# Patient Record
Sex: MALE | NOT HISPANIC OR LATINO | Employment: FULL TIME | URBAN - METROPOLITAN AREA
[De-identification: names, ages, dates, MRNs, and addresses within clinical notes are randomized per-mention and may not be internally consistent; named-entity substitution may affect disease eponyms.]

---

## 2017-02-06 DIAGNOSIS — Z01.812 PRE-OPERATIVE LABORATORY EXAMINATION: ICD-10-CM

## 2017-02-06 LAB
ANION GAP SERPL CALC-SCNC: 11 MMOL/L (ref 0–11.9)
BUN SERPL-MCNC: 17 MG/DL (ref 8–22)
CALCIUM SERPL-MCNC: 9.6 MG/DL (ref 8.5–10.5)
CHLORIDE SERPL-SCNC: 104 MMOL/L (ref 96–112)
CO2 SERPL-SCNC: 23 MMOL/L (ref 20–33)
CREAT SERPL-MCNC: 0.84 MG/DL (ref 0.5–1.4)
ERYTHROCYTE [DISTWIDTH] IN BLOOD BY AUTOMATED COUNT: 39.2 FL (ref 35.9–50)
GFR SERPL CREATININE-BSD FRML MDRD: >60 ML/MIN/1.73 M 2
GLUCOSE SERPL-MCNC: 95 MG/DL (ref 65–99)
HCT VFR BLD AUTO: 42.5 % (ref 42–52)
HGB BLD-MCNC: 14.4 G/DL (ref 14–18)
MCH RBC QN AUTO: 28.4 PG (ref 27–33)
MCHC RBC AUTO-ENTMCNC: 33.9 G/DL (ref 33.7–35.3)
MCV RBC AUTO: 83.8 FL (ref 81.4–97.8)
PLATELET # BLD AUTO: 269 K/UL (ref 164–446)
PMV BLD AUTO: 9.9 FL (ref 9–12.9)
POTASSIUM SERPL-SCNC: 3.9 MMOL/L (ref 3.6–5.5)
RBC # BLD AUTO: 5.07 M/UL (ref 4.7–6.1)
SODIUM SERPL-SCNC: 138 MMOL/L (ref 135–145)
WBC # BLD AUTO: 9.3 K/UL (ref 4.8–10.8)

## 2017-02-06 PROCEDURE — 80048 BASIC METABOLIC PNL TOTAL CA: CPT

## 2017-02-06 PROCEDURE — 36415 COLL VENOUS BLD VENIPUNCTURE: CPT

## 2017-02-06 PROCEDURE — 85027 COMPLETE CBC AUTOMATED: CPT

## 2017-02-07 ENCOUNTER — HOSPITAL ENCOUNTER (OUTPATIENT)
Facility: MEDICAL CENTER | Age: 27
End: 2017-02-07
Attending: SPECIALIST | Admitting: SPECIALIST
Payer: COMMERCIAL

## 2017-02-07 VITALS
TEMPERATURE: 98.1 F | WEIGHT: 160.72 LBS | HEIGHT: 70 IN | HEART RATE: 61 BPM | RESPIRATION RATE: 16 BRPM | OXYGEN SATURATION: 95 % | BODY MASS INDEX: 23.01 KG/M2

## 2017-02-07 PROBLEM — J35.1 TONSILLAR HYPERTROPHY: Status: ACTIVE | Noted: 2017-02-07

## 2017-02-07 PROBLEM — J36 ABSCESS, PERITONSILLAR: Status: ACTIVE | Noted: 2017-02-07

## 2017-02-07 PROBLEM — Z98.890 H/O PERITONSILLAR ABSCESS DRAINAGE: Status: ACTIVE | Noted: 2017-02-07

## 2017-02-07 PROCEDURE — 160035 HCHG PACU - 1ST 60 MINS PHASE I: Performed by: SPECIALIST

## 2017-02-07 PROCEDURE — 160048 HCHG OR STATISTICAL LEVEL 1-5: Performed by: SPECIALIST

## 2017-02-07 PROCEDURE — 501838 HCHG SUTURE GENERAL: Performed by: SPECIALIST

## 2017-02-07 PROCEDURE — 500257: Performed by: SPECIALIST

## 2017-02-07 PROCEDURE — 700111 HCHG RX REV CODE 636 W/ 250 OVERRIDE (IP)

## 2017-02-07 PROCEDURE — 160002 HCHG RECOVERY MINUTES (STAT): Performed by: SPECIALIST

## 2017-02-07 PROCEDURE — 501424 HCHG SPONGE, TONSIL: Performed by: SPECIALIST

## 2017-02-07 PROCEDURE — 700101 HCHG RX REV CODE 250

## 2017-02-07 PROCEDURE — 160027 HCHG SURGERY MINUTES - 1ST 30 MINS LEVEL 2: Performed by: SPECIALIST

## 2017-02-07 PROCEDURE — 160036 HCHG PACU - EA ADDL 30 MINS PHASE I: Performed by: SPECIALIST

## 2017-02-07 PROCEDURE — 502573 HCHG PACK, ENT: Performed by: SPECIALIST

## 2017-02-07 PROCEDURE — 88304 TISSUE EXAM BY PATHOLOGIST: CPT | Mod: 59

## 2017-02-07 PROCEDURE — 110382 HCHG SHELL REV 271: Performed by: SPECIALIST

## 2017-02-07 PROCEDURE — 110371 HCHG SHELL REV 272: Performed by: SPECIALIST

## 2017-02-07 PROCEDURE — A4606 OXYGEN PROBE USED W OXIMETER: HCPCS | Performed by: SPECIALIST

## 2017-02-07 PROCEDURE — A9270 NON-COVERED ITEM OR SERVICE: HCPCS

## 2017-02-07 PROCEDURE — 501155 HCHG PROBE, ENT ORAL: Performed by: SPECIALIST

## 2017-02-07 PROCEDURE — 160009 HCHG ANES TIME/MIN: Performed by: SPECIALIST

## 2017-02-07 PROCEDURE — 502240 HCHG MISC OR SUPPLY RC 0272: Performed by: SPECIALIST

## 2017-02-07 PROCEDURE — 160038 HCHG SURGERY MINUTES - EA ADDL 1 MIN LEVEL 2: Performed by: SPECIALIST

## 2017-02-07 PROCEDURE — 700102 HCHG RX REV CODE 250 W/ 637 OVERRIDE(OP)

## 2017-02-07 RX ORDER — OXYCODONE HCL 5 MG/5 ML
SOLUTION, ORAL ORAL
Status: COMPLETED
Start: 2017-02-07 | End: 2017-02-07

## 2017-02-07 RX ORDER — BUPIVACAINE HYDROCHLORIDE AND EPINEPHRINE 2.5; 5 MG/ML; UG/ML
INJECTION, SOLUTION EPIDURAL; INFILTRATION; INTRACAUDAL; PERINEURAL
Status: DISCONTINUED | OUTPATIENT
Start: 2017-02-07 | End: 2017-02-07 | Stop reason: HOSPADM

## 2017-02-07 RX ORDER — ONDANSETRON 4 MG/1
4 TABLET, ORALLY DISINTEGRATING ORAL EVERY 6 HOURS PRN
Qty: 10 TAB | Refills: 2 | Status: SHIPPED | OUTPATIENT
Start: 2017-02-07

## 2017-02-07 RX ORDER — SODIUM CHLORIDE, SODIUM LACTATE, POTASSIUM CHLORIDE, CALCIUM CHLORIDE 600; 310; 30; 20 MG/100ML; MG/100ML; MG/100ML; MG/100ML
1000 INJECTION, SOLUTION INTRAVENOUS
Status: COMPLETED | OUTPATIENT
Start: 2017-02-07 | End: 2017-02-07

## 2017-02-07 RX ORDER — ONDANSETRON 2 MG/ML
4 INJECTION INTRAMUSCULAR; INTRAVENOUS EVERY 6 HOURS PRN
Status: DISCONTINUED | OUTPATIENT
Start: 2017-02-07 | End: 2017-02-07 | Stop reason: HOSPADM

## 2017-02-07 RX ADMIN — HYDROMORPHONE HYDROCHLORIDE 0.5 MG: 1 INJECTION, SOLUTION INTRAMUSCULAR; INTRAVENOUS; SUBCUTANEOUS at 13:50

## 2017-02-07 RX ADMIN — HYDROMORPHONE HYDROCHLORIDE 0.5 MG: 1 INJECTION, SOLUTION INTRAMUSCULAR; INTRAVENOUS; SUBCUTANEOUS at 15:17

## 2017-02-07 RX ADMIN — FENTANYL CITRATE 25 MCG: 50 INJECTION, SOLUTION INTRAMUSCULAR; INTRAVENOUS at 16:18

## 2017-02-07 RX ADMIN — SODIUM CHLORIDE, SODIUM LACTATE, POTASSIUM CHLORIDE, CALCIUM CHLORIDE 1000 ML: 600; 310; 30; 20 INJECTION, SOLUTION INTRAVENOUS at 11:20

## 2017-02-07 RX ADMIN — FENTANYL CITRATE 25 MCG: 50 INJECTION, SOLUTION INTRAMUSCULAR; INTRAVENOUS at 13:11

## 2017-02-07 RX ADMIN — HYDROMORPHONE HYDROCHLORIDE 0.5 MG: 1 INJECTION, SOLUTION INTRAMUSCULAR; INTRAVENOUS; SUBCUTANEOUS at 14:26

## 2017-02-07 RX ADMIN — HYDROMORPHONE HYDROCHLORIDE 0.5 MG: 1 INJECTION, SOLUTION INTRAMUSCULAR; INTRAVENOUS; SUBCUTANEOUS at 13:45

## 2017-02-07 RX ADMIN — FENTANYL CITRATE 25 MCG: 50 INJECTION, SOLUTION INTRAMUSCULAR; INTRAVENOUS at 15:35

## 2017-02-07 RX ADMIN — OXYCODONE HYDROCHLORIDE 10 MG: 5 SOLUTION ORAL at 13:10

## 2017-02-07 RX ADMIN — FENTANYL CITRATE 50 MCG: 50 INJECTION, SOLUTION INTRAMUSCULAR; INTRAVENOUS at 13:16

## 2017-02-07 ASSESSMENT — PAIN SCALES - GENERAL
PAINLEVEL_OUTOF10: 5
PAINLEVEL_OUTOF10: 5
PAINLEVEL_OUTOF10: 4
PAINLEVEL_OUTOF10: 5
PAINLEVEL_OUTOF10: 8
PAINLEVEL_OUTOF10: 4
PAINLEVEL_OUTOF10: 4
PAINLEVEL_OUTOF10: 6
PAINLEVEL_OUTOF10: 4
PAINLEVEL_OUTOF10: ASSUMED PAIN PRESENT
PAINLEVEL_OUTOF10: 4
PAINLEVEL_OUTOF10: 5
PAINLEVEL_OUTOF10: ASSUMED PAIN PRESENT
PAINLEVEL_OUTOF10: 5

## 2017-02-07 NOTE — IP AVS SNAPSHOT
" Home Care Instructions                                                                                                                Name:Rene Fenton  Medical Record Number:[id number]  CSN: [id number]    YOB: 1990   Age: 26 y.o.  Sex: male  HT:1.778 m (5' 10\") WT: 72.9 kg (160 lb 11.5 oz)          Admit Date: 2/7/2017     Discharge Date:   Today's Date: 2/7/2017  Attending Doctor:  Felton Mendoza M.D.                  Allergies:  Review of patient's allergies indicates no known allergies.                Discharge Instructions         ACTIVITY: Rest and take it easy for the first 24 hours.  A responsible adult is recommended to remain with you during that time.  It is normal to feel sleepy.  We encourage you to not do anything that requires balance, judgment or coordination.    MILD FLU-LIKE SYMPTOMS ARE NORMAL. YOU MAY EXPERIENCE GENERALIZED MUSCLE ACHES, THROAT IRRITATION, HEADACHE AND/OR SOME NAUSEA.    FOR 24 HOURS DO NOT:  Drive, operate machinery or run household appliances.  Drink beer or alcoholic beverages.   Make important decisions or sign legal documents.    SPECIAL INSTRUCTIONS: *see tonsillectomy instruction sheet, no citrus products (lemon, lime, tomato or grapefruit)**    DIET: To avoid nausea, slowly advance diet as tolerated, avoiding spicy or greasy foods for the first day.  Add more substantial food to your diet according to your physician's instructions.  Babies can be fed formula or breast milk as soon as they are hungry.  INCREASE FLUIDS AND FIBER TO AVOID CONSTIPATION.    SURGICAL DRESSING/BATHING: *avoid excessively hot showers**    FOLLOW-UP APPOINTMENT:  A follow-up appointment should be arranged with your doctor; call to schedule.    You should CALL YOUR PHYSICIAN if you develop:  Fever greater than 101 degrees F.  Pain not relieved by medication, or persistent nausea or vomiting.  Excessive bleeding (blood soaking through dressing) or unexpected drainage from the " wound.  Extreme redness or swelling around the incision site, drainage of pus or foul smelling drainage.  Inability to urinate or empty your bladder within 8 hours.  Problems with breathing or chest pain.    You should call 911 if you develop problems with breathing or chest pain.  If you are unable to contact your doctor or surgical center, you should go to the nearest emergency room or urgent care center.  Physician's telephone #: **[phone]*    If any questions arise, call your doctor.  If your doctor is not available, please feel free to call the Surgical Center at [phone].  The Center is open Monday through Friday from 7AM to 7PM.  You can also call the HEALTH HOTLINE open 24 hours/day, 7 days/week and speak to a nurse at [phone], or toll free at [phone].    A registered nurse may call you a few days after your surgery to see how you are doing after your procedure.    MEDICATIONS: Resume taking daily medication.  Take prescribed pain medication with food.  If no medication is prescribed, you may take non-aspirin pain medication if needed.  PAIN MEDICATION CAN BE VERY CONSTIPATING.  Take a stool softener or laxative such as senokot, pericolace, or milk of magnesia if needed.    Prescription given for *zofran**.  Last pain medication given at *1:10 pm**.    If your physician has prescribed pain medication that includes Acetaminophen (Tylenol), do not take additional Acetaminophen (Tylenol) while taking the prescribed medication.    Depression / Suicide Risk    As you are discharged from this [facility] facility, it is important to learn how to keep safe from harming yourself.    Recognize the warning signs:  · Abrupt changes in personality, positive or negative- including increase in energy   · Giving away possessions  · Change in eating patterns- significant weight changes-  positive or negative  · Change in sleeping patterns- unable to sleep or sleeping all the time   · Unwillingness or  inability to communicate  · Depression  · Unusual sadness, discouragement and loneliness  · Talk of wanting to die  · Neglect of personal appearance   · Rebelliousness- reckless behavior  · Withdrawal from people/activities they love  · Confusion- inability to concentrate     If you or a loved one observes any of these behaviors or has concerns about self-harm, here's what you can do:  · Talk about it- your feelings and reasons for harming yourself  · Remove any means that you might use to hurt yourself (examples: pills, rope, extension cords, firearm)  · Get professional help from the community (Mental Health, Substance Abuse, psychological counseling)  · Do not be alone:Call your Safe Contact- someone whom you trust who will be there for you.  · Call your local CRISIS HOTLINE 333-5298 or 584-750-8793  · Call your local Children's Mobile Crisis Response Team [place] (355) 958-3413 or www.[employer]  · Call the toll free National Suicide Prevention Hotlines   · National Suicide Prevention Lifeline 861-559-STOC (7286)  · [employer] Hope Line Network 800-SUICIDE (942-2210)       Medication List      START taking these medications        Instructions    ondansetron 4 MG Tbdp   Commonly known as:  ZOFRAN ODT    Take 1 Tab by mouth every 6 hours as needed for Nausea/Vomiting.   Dose:  4 mg         CONTINUE taking these medications        Instructions    CLINDAMYCIN HCL PO    Take 600 mg by mouth 3 times a day.   Dose:  600 mg       MEDROL (MITUL) PO    Take 4 mg by mouth every day.   Dose:  4 mg       MULTIVITAMINS PO    Take 1 Tab by mouth every day.   Dose:  1 Tab               Medication Information     Above and/or attached are the medications your physician expects you to take upon discharge. Review all of your home medications and newly ordered medications with your doctor and/or pharmacist. Follow medication instructions as directed by your doctor and/or pharmacist. Please keep your medication list with  you and share with your physician. Update the information when medications are discontinued, doses are changed, or new medications (including over-the-counter products) are added; and carry medication information at all times in the event of emergency situations.        Resources     Quit Smoking / Tobacco Use:    I understand the use of any tobacco products increases my chance of suffering from future heart disease or stroke and could cause other illnesses which may shorten my life. Quitting the use of tobacco products is the single most important thing I can do to improve my health. For further information on smoking / tobacco cessation call a Toll Free Quit Line at 1-650.525.4851 (*National Cancer [place]) or 1-203.393.5319 (American Lung Association) or you can access the web based program at www.lungusa.org.    [place] Tobacco Users Help Line:  (195) 591-6336       Toll Free: 1-367.571.9585  Quit Tobacco Program [facility] (011)500-1769    Crisis Hotline:    [place] Crisis Hotline:  2-347-NWFSZNN or 1-149.275.2557    [place] Crisis Hotline:    1-323.639.6162 or 979-936-5216    Discharge Survey:   Thank you for choosing [facility]. We hope we did everything we could to make your hospital stay a pleasant one. You may be receiving a survey and we would appreciate your time and participation in answering the questions. Your input is very valuable to us in our efforts to improve our service to our patients and their families.            Signatures     My signature on this form indicates that:    1. I acknowledge receipt and understanding of these Home Care Instruction.  2. My questions regarding this information have been answered to my satisfaction.  3. I have formulated a plan with my discharge nurse to obtain my prescribed medications for home.    __________________________________      __________________________________                   Patient Signature                                  Guardian/Responsible Adult Signature      __________________________________                 __________       ________                       Nurse Signature                                               Date                 Time

## 2017-02-07 NOTE — IP AVS SNAPSHOT
2/7/2017          Rene Fenton  [address], [address]  [place] [address]    Dear Rene:    [facility] wants to ensure your discharge home is safe and you or your loved ones have had all your questions answered regarding your care after you leave the hospital.    You may receive a telephone call within two days of your discharge.  This call is to make certain you understand your discharge instructions as well as ensure we provided you with the best care possible during your stay with us.     The call will only last approximately 3-5 minutes and will be done by a nurse.    Once again, we want to ensure your discharge home is safe and that you have a clear understanding of any next steps in your care.  If you have any questions or concerns, please do not hesitate to contact us, we are here for you.  Thank you for choosing [facility] for your healthcare needs.    Sincerely,    Sathya Julian    [facility]

## 2017-02-07 NOTE — OR SURGEON
Immediate Post-Operative Note      PreOp Diagnosis: H/O peritonsillar abscess, tonsillar hypertrophy    PostOp Diagnosis: same, peritonsillar abscess right    Procedure(s):  TONSILLECTOMY - Wound Class: Clean Contaminated    Surgeon(s):  Felton Mendoza M.D.    Anesthesiologist/Type of Anesthesia:  Anesthesiologist: Mu Clarke D.O./General    Surgical Staff:  Circulator: Alta Dowell R.N.  Relief Circulator: Maritza Waters R.N.  Scrub Person: Cleopatra Ledezma    Specimen: tonsils, right and left    Estimated Blood Loss: 75 ml    Findings: abscess right, multiple sutures place on right for hemostasis    Complications: none        2/7/2017 1:00 PM Felton Mendoza

## 2017-02-07 NOTE — PROGRESS NOTES
1300-received pt from OR with report from Dr Clarke. VSS.  Oral airway in place, dc'd without difficulty. HOB elevated. Pt c/o pain, medicated with oxycodone PO, fentanyl IV.   1325-pt rates pain 4/10, tolerable.  1345-medicated with dilaudid IV for increase in pain. Pt tolerating bites of popsicle. Dad to bedside  1430-pt rates pain 4/10, tolerable.  Break report to Tosin OCONNELL

## 2017-02-07 NOTE — DISCHARGE INSTRUCTIONS
ACTIVITY: Rest and take it easy for the first 24 hours.  A responsible adult is recommended to remain with you during that time.  It is normal to feel sleepy.  We encourage you to not do anything that requires balance, judgment or coordination.    MILD FLU-LIKE SYMPTOMS ARE NORMAL. YOU MAY EXPERIENCE GENERALIZED MUSCLE ACHES, THROAT IRRITATION, HEADACHE AND/OR SOME NAUSEA.    FOR 24 HOURS DO NOT:  Drive, operate machinery or run household appliances.  Drink beer or alcoholic beverages.   Make important decisions or sign legal documents.    SPECIAL INSTRUCTIONS: *see tonsillectomy instruction sheet, no citrus products (lemon, lime, tomato or grapefruit)**    DIET: To avoid nausea, slowly advance diet as tolerated, avoiding spicy or greasy foods for the first day.  Add more substantial food to your diet according to your physician's instructions.  Babies can be fed formula or breast milk as soon as they are hungry.  INCREASE FLUIDS AND FIBER TO AVOID CONSTIPATION.    SURGICAL DRESSING/BATHING: *avoid excessively hot showers**    FOLLOW-UP APPOINTMENT:  A follow-up appointment should be arranged with your doctor; call to schedule.    You should CALL YOUR PHYSICIAN if you develop:  Fever greater than 101 degrees F.  Pain not relieved by medication, or persistent nausea or vomiting.  Excessive bleeding (blood soaking through dressing) or unexpected drainage from the wound.  Extreme redness or swelling around the incision site, drainage of pus or foul smelling drainage.  Inability to urinate or empty your bladder within 8 hours.  Problems with breathing or chest pain.    You should call 911 if you develop problems with breathing or chest pain.  If you are unable to contact your doctor or surgical center, you should go to the nearest emergency room or urgent care center.  Physician's telephone #: **[phone]*    If any questions arise, call your doctor.  If your doctor is not available, please feel free to call the  Surgical Center at [phone].  The Center is open Monday through Friday from 7AM to 7PM.  You can also call the HEALTH HOTLINE open 24 hours/day, 7 days/week and speak to a nurse at [phone], or toll free at [phone].    A registered nurse may call you a few days after your surgery to see how you are doing after your procedure.    MEDICATIONS: Resume taking daily medication.  Take prescribed pain medication with food.  If no medication is prescribed, you may take non-aspirin pain medication if needed.  PAIN MEDICATION CAN BE VERY CONSTIPATING.  Take a stool softener or laxative such as senokot, pericolace, or milk of magnesia if needed.    Prescription given for *zofran**.  Last pain medication given at *1:10 pm**.    If your physician has prescribed pain medication that includes Acetaminophen (Tylenol), do not take additional Acetaminophen (Tylenol) while taking the prescribed medication.    Depression / Suicide Risk    As you are discharged from this [facility] facility, it is important to learn how to keep safe from harming yourself.    Recognize the warning signs:  · Abrupt changes in personality, positive or negative- including increase in energy   · Giving away possessions  · Change in eating patterns- significant weight changes-  positive or negative  · Change in sleeping patterns- unable to sleep or sleeping all the time   · Unwillingness or inability to communicate  · Depression  · Unusual sadness, discouragement and loneliness  · Talk of wanting to die  · Neglect of personal appearance   · Rebelliousness- reckless behavior  · Withdrawal from people/activities they love  · Confusion- inability to concentrate     If you or a loved one observes any of these behaviors or has concerns about self-harm, here's what you can do:  · Talk about it- your feelings and reasons for harming yourself  · Remove any means that you might use to hurt yourself (examples: pills, rope, extension cords,  firearm)  · Get professional help from the community (Mental Health, Substance Abuse, psychological counseling)  · Do not be alone:Call your Safe Contact- someone whom you trust who will be there for you.  · Call your local CRISIS HOTLINE 897-1821 or 830-751-9011  · Call your local Children's Mobile Crisis Response Team [place] (400) 437-5941 or www.[employer]  · Call the toll free National Suicide Prevention Hotlines   · National Suicide Prevention Lifeline 954-565-HTJX (2255)  · National Hope Line Network 800-SUICIDE (031-9184)

## 2017-02-08 NOTE — OP REPORT
DATE OF SERVICE:  02/07/2017    SURGEON:  Felton Mendoza MD    ASSISTANT:  None.    ANESTHESIA:  General given endotracheal tube by Dr. Clarke.    PREOPERATIVE DIAGNOSES:  History of peritonsillar abscess, tonsillar   hypertrophy.    POSTOPERATIVE DIAGNOSES:  History of peritonsillar abscess, tonsillar   hypertrophy, peritonsillar abscess, right.    NAME OF THE PROCEDURE:  Bilateral tonsillectomy.    INDICATIONS:  Patient is a 26-year-old male who developed a peritonsillar   abscess approximately 5-6 weeks ago on the right side.  I and D followed by   antibiotic treatment yielded improvement, but the patient then developed a   recurrent abscess approximately a week and a half ago.  The patient started   back on clindamycin and spontaneous drainage of the abscess was reported by   the patient at his evaluation approximately 5 days ago.  He was scheduled for   tonsillectomy and Medrol Dosepak and completion of the course of clindamycin   were advised.    DESCRIPTION OF PROCEDURE:  Patient was brought in the operating room and   placed in supine position on the operating table.  General anesthesia was   induced and the patient was endotracheally intubated without difficulty.  Eyes   are protected with taping and the table was turned 90 degrees.  Shoulder roll   was placed beneath the shoulders and head drape was placed about the head.    McIvor mouth gag was introduced into the oral cavity, taking care not to   dislodge the endotracheal tube.    Upon opening the retractor, the patient was noted to have asymmetrically   enlarged tonsils with the right being larger than the left.  There is   induration present along the soft palate and peritonsillar area, but   fluctuance is not apparent.  No cleft or submucous cleft to the palate is   noted.    Attention was first directed to left tonsil where an intratonsillar pillar   incision was made followed by the identification of the tonsillar capsule and   dissection along the  capsule from superior to inferior direction utilizing the   Gold laser at a power setting of 14 best continuous.  Tonsils delivered by   dissection means alone.  Bleeding was controlled by spot cauterization,   bleeding points with the laser in a deep focus fashion both during dissection   as well as following removal of the tonsil.  Once bleeding was controlled, the   mouth gag was released and then reopened.    Attention was then directed to the right tonsil.  Anterior tonsillar pillar   incision was made utilizing the Gold laser.  Marked fibrosis and poor   delineation of a tonsillar capsule was noted.  Dissection was undertaken   initially from the superior to inferior direction along what was felt to be   the capsule and fibrotic area, but moderate bleeding ensued from prominent   vessels present along the superior pole area.  The anterior tonsillar pillar   incision was extended inferiorly to the base of the tonsillar fossa and the   tonsillar capsule was then identified inferiorly.  Dissection was undertaken   with the laser from an inferior to superior direction along the tonsillar   capsule until an abscess pocket is opened.  Approximately 5-10 mL of grossly   purulent material was noted and aspirated.  Dissection was undertaken along   the abscess pocket again from an inferior to superior direction and from a   lateral to medial direction.  The fibrotic tissue along the superior pole area   and then eventually divided using both the Gold laser as well as scissors   dissection.  Tonsils were eventually removed in this fashion.    Following removal, multiple bleeding sites and the tonsillar fossa area   dressed both with a Gold laser and then with a suction cautery.  Persistent   oozing and bleeding was noted from the mid and superior pole area from 2   sites.  Attempts at control of bleeding with suction cautery alone were not   successful.  Three figure-of-eight stitches of 2-0 chromic suture are placed    along the mid upper and superior pole areas to approximate the soft tissue to   oversew the blood vessels.  An additional 2-0 chromic suture was then used to   approximate the anterior and posterior tonsillar pillars to oversew the area   further.  Once bleeding was controlled in this fashion, the mouth gag was   released and then reopened.  No further significant bleeding is noted.    Approximately 6 mL of 0.5% Marcaine solution with epinephrine was used to   infiltrate both tonsillar fossa for postoperative pain control.  Stomach   contents were then aspirated.  The mouth gag was then removed.  The patient   was subsequently awakened from anesthesia and extubated without difficulty.    He was taken from the operating room to the recovery area, awake, breathing on   his own in stable condition.  Blood loss is felt to be 60-75 mL.  There were   no apparent complications.       ____________________________________     MD NETTE ANNE / ILIANA    DD:  02/07/2017 13:08:06  DT:  02/07/2017 16:28:36    D#:  [id number]  Job#:  [id number]

## 2017-02-08 NOTE — PROGRESS NOTES
1515-report from Tosin, reassumed care of pt.    1530-pt dressed, repositioned in recliner, c/o increase in pain. Medicated with fentanyl IV.  Ice collar replaced to neck.   1540-pt rates pain 4/10, tolerable.  Tolerating PO, multiple popsicles, ice water consumed in pacu.  1650-father at bedside. Pt meets criteria for discharge, expresses readiness. Instructions reviewed, pt and father express understanding. No bleeding visualized in tonsil bed. Pt refuses wheelchair. Discharged at 1702, ambulatory out with father at side, steady on feet

## 2017-10-17 ENCOUNTER — IMMUNIZATION (OUTPATIENT)
Dept: OCCUPATIONAL MEDICINE | Facility: CLINIC | Age: 27
End: 2017-10-17

## 2017-10-17 DIAGNOSIS — Z23 NEED FOR VACCINATION: ICD-10-CM

## 2017-10-17 PROCEDURE — 90686 IIV4 VACC NO PRSV 0.5 ML IM: CPT | Performed by: PREVENTIVE MEDICINE

## 2018-10-01 ENCOUNTER — IMMUNIZATION (OUTPATIENT)
Dept: OCCUPATIONAL MEDICINE | Facility: CLINIC | Age: 28
End: 2018-10-01

## 2018-10-01 DIAGNOSIS — Z23 NEED FOR VACCINATION: ICD-10-CM

## 2018-10-01 PROCEDURE — 90686 IIV4 VACC NO PRSV 0.5 ML IM: CPT | Performed by: PREVENTIVE MEDICINE

## (undated) DEVICE — CATHETER IV 20 GA X 1-1/4 ---SURG.& SDS ONLY--- (50EA/BX)

## (undated) DEVICE — LEAD SET 6 DISP. EKG NIHON KOHDEN

## (undated) DEVICE — GLOVE SZ 7.5 BIOGEL PI MICRO - PF LF (50PR/BX)

## (undated) DEVICE — SUCTION INSTRUMENT YANKAUER BULBOUS TIP W/O VENT (50EA/CA)

## (undated) DEVICE — SENSOR SPO2 NEO LNCS ADHESIVE (20/BX) SEE USER NOTES

## (undated) DEVICE — SODIUM CHL IRRIGATION 0.9% 1000ML (12EA/CA)

## (undated) DEVICE — SPONGE TONSIL MEDIUM XRAY STERILE 1 - (5/PK 20PK/CA)"

## (undated) DEVICE — ELECTRODE DUAL RETURN W/ CORD - (50/PK)

## (undated) DEVICE — PACK ENT OR - (2EA/CA)

## (undated) DEVICE — SUTURE 2-0 CHROMIC GUT SH 27 (36PK/BX)"

## (undated) DEVICE — SET LEADWIRE 5 LEAD BEDSIDE DISPOSABLE ECG (1SET OF 5/EA)

## (undated) DEVICE — ANTI-FOG SOLUTION - 60BTL/CA

## (undated) DEVICE — TUBE CONNECT SUCTION CLEAR 120 X 1/4" (50EA/CA)"

## (undated) DEVICE — TUBE CONNECTING SUCTION - CLEAR PLASTIC STERILE 72 IN (50EA/CA)

## (undated) DEVICE — MASK ANESTHESIA ADULT  - (100/CA)

## (undated) DEVICE — ELECTRODE 850 FOAM ADHESIVE - HYDROGEL RADIOTRNSPRNT (50/PK)

## (undated) DEVICE — Device

## (undated) DEVICE — TUBE NG SALEM SUMP 16FR (50EA/CA)

## (undated) DEVICE — KIT ANESTHESIA W/CIRCUIT & 3/LT BAG W/FILTER (20EA/CA)

## (undated) DEVICE — LACTATED RINGERS INJ 1000 ML - (14EA/CA 60CA/PF)

## (undated) DEVICE — HEAD HOLDER JUNIOR/ADULT

## (undated) DEVICE — CATHETER FOLEY ROBINSON 10FR 16IN STRL (12EA/CA)

## (undated) DEVICE — SUTURE GENERAL

## (undated) DEVICE — WATER IRRIGATION STERILE 1000ML (12EA/CA)

## (undated) DEVICE — CANISTER SUCTION 3000ML MECHANICAL FILTER AUTO SHUTOFF MEDI-VAC NONSTERILE LF DISP  (40EA/CA)

## (undated) DEVICE — TUBING CLEARLINK DUO-VENT - C-FLO (48EA/CA)

## (undated) DEVICE — PROTECTOR ULNA NERVE - (36PR/CA)

## (undated) DEVICE — GLOVE BIOGEL PI INDICATOR SZ 7.0 SURGICAL PF LF - (50/BX 4BX/CA)

## (undated) DEVICE — CANISTER SUCTION RIGID RED 1500CC (40EA/CA)

## (undated) DEVICE — KIT  I.V. START (100EA/CA)

## (undated) DEVICE — PROBE ENT ORAL LPT1635FN - (10/BX)